# Patient Record
Sex: MALE | Race: WHITE | NOT HISPANIC OR LATINO | URBAN - METROPOLITAN AREA
[De-identification: names, ages, dates, MRNs, and addresses within clinical notes are randomized per-mention and may not be internally consistent; named-entity substitution may affect disease eponyms.]

---

## 2018-04-09 ENCOUNTER — NEW PATIENT (OUTPATIENT)
Dept: URBAN - METROPOLITAN AREA CLINIC 24 | Facility: CLINIC | Age: 73
End: 2018-04-09
Payer: COMMERCIAL

## 2018-04-09 DIAGNOSIS — H00.021 HORDEOLUM INTERNUM (MEIBOMIAN GLAND DYSFUNCTION), RIGHT UPPER LID: ICD-10-CM

## 2018-04-09 DIAGNOSIS — H52.4 PRESBYOPIA: ICD-10-CM

## 2018-04-09 DIAGNOSIS — H25.813 COMBINED FORMS OF AGE-RELATED CATARACT, BILATERAL: Primary | ICD-10-CM

## 2018-04-09 DIAGNOSIS — H00.12 CHALAZION RIGHT LOWER LID: ICD-10-CM

## 2018-04-09 DIAGNOSIS — H00.024 HORDEOLUM INTERNUM (MEIBOMIAN GLAND DYSFUNCTION), LEFT UPPER LID: ICD-10-CM

## 2018-04-09 DIAGNOSIS — H16.143 PUNCTATE KERATITIS, BILATERAL: ICD-10-CM

## 2018-04-09 PROCEDURE — 92015 DETERMINE REFRACTIVE STATE: CPT | Performed by: OPTOMETRIST

## 2018-04-09 PROCEDURE — 92004 COMPRE OPH EXAM NEW PT 1/>: CPT | Performed by: OPTOMETRIST

## 2018-04-09 RX ORDER — OMEGA-3 FATTY ACIDS 1000 MG
CAPSULE ORAL
Qty: 30 | Refills: 3 | Status: ACTIVE
Start: 2018-04-09

## 2018-04-09 ASSESSMENT — VISUAL ACUITY
OS: 20/25
OD: 20/25

## 2018-04-09 ASSESSMENT — KERATOMETRY
OS: 42.01
OD: 42.04

## 2018-04-09 ASSESSMENT — INTRAOCULAR PRESSURE
OS: 20
OD: 20

## 2019-11-25 ENCOUNTER — FOLLOW UP ESTABLISHED (OUTPATIENT)
Dept: URBAN - METROPOLITAN AREA CLINIC 24 | Facility: CLINIC | Age: 74
End: 2019-11-25
Payer: COMMERCIAL

## 2019-11-25 DIAGNOSIS — H43.811 VITREOUS DEGENERATION, RIGHT EYE: ICD-10-CM

## 2019-11-25 PROCEDURE — 92014 COMPRE OPH EXAM EST PT 1/>: CPT | Performed by: OPTOMETRIST

## 2019-11-25 PROCEDURE — 92134 CPTRZ OPH DX IMG PST SGM RTA: CPT | Performed by: OPTOMETRIST

## 2019-11-25 ASSESSMENT — KERATOMETRY
OS: 42.09
OD: 42.09

## 2019-11-25 ASSESSMENT — INTRAOCULAR PRESSURE
OD: 13
OS: 12

## 2019-11-25 ASSESSMENT — VISUAL ACUITY
OS: 20/25
OD: 20/20

## 2021-05-30 ENCOUNTER — RECORDS - HEALTHEAST (OUTPATIENT)
Dept: ADMINISTRATIVE | Facility: CLINIC | Age: 76
End: 2021-05-30

## 2021-06-05 ENCOUNTER — RECORDS - HEALTHEAST (OUTPATIENT)
Dept: SCHEDULING | Facility: CLINIC | Age: 76
End: 2021-06-05

## 2021-06-05 DIAGNOSIS — N04.9 NEPHROTIC SYNDROME WITH PATHOLOGICAL LESION IN KIDNEY: ICD-10-CM

## 2024-02-10 ENCOUNTER — APPOINTMENT (OUTPATIENT)
Dept: CT IMAGING | Facility: CLINIC | Age: 79
End: 2024-02-10
Attending: STUDENT IN AN ORGANIZED HEALTH CARE EDUCATION/TRAINING PROGRAM
Payer: MEDICARE

## 2024-02-10 ENCOUNTER — HOSPITAL ENCOUNTER (EMERGENCY)
Facility: CLINIC | Age: 79
Discharge: HOME | End: 2024-02-10
Attending: STUDENT IN AN ORGANIZED HEALTH CARE EDUCATION/TRAINING PROGRAM | Admitting: STUDENT IN AN ORGANIZED HEALTH CARE EDUCATION/TRAINING PROGRAM
Payer: MEDICARE

## 2024-02-10 VITALS
RESPIRATION RATE: 51 BRPM | OXYGEN SATURATION: 98 % | SYSTOLIC BLOOD PRESSURE: 133 MMHG | BODY MASS INDEX: 19.89 KG/M2 | HEART RATE: 75 BPM | DIASTOLIC BLOOD PRESSURE: 78 MMHG | HEIGHT: 74 IN | WEIGHT: 155 LBS

## 2024-02-10 DIAGNOSIS — J43.2 CENTRILOBULAR EMPHYSEMA (H): ICD-10-CM

## 2024-02-10 LAB
ANION GAP SERPL CALCULATED.3IONS-SCNC: 6 MMOL/L (ref 7–15)
BASOPHILS # BLD AUTO: 0 10E3/UL (ref 0–0.2)
BASOPHILS NFR BLD AUTO: 0 %
BUN SERPL-MCNC: 11.9 MG/DL (ref 8–23)
CALCIUM SERPL-MCNC: 9 MG/DL (ref 8.8–10.2)
CHLORIDE SERPL-SCNC: 103 MMOL/L (ref 98–107)
CREAT SERPL-MCNC: 0.97 MG/DL (ref 0.67–1.17)
DEPRECATED HCO3 PLAS-SCNC: 28 MMOL/L (ref 22–29)
EGFRCR SERPLBLD CKD-EPI 2021: 80 ML/MIN/1.73M2
EOSINOPHIL # BLD AUTO: 0.2 10E3/UL (ref 0–0.7)
EOSINOPHIL NFR BLD AUTO: 3 %
ERYTHROCYTE [DISTWIDTH] IN BLOOD BY AUTOMATED COUNT: 12.9 % (ref 10–15)
GLUCOSE SERPL-MCNC: 91 MG/DL (ref 70–99)
HCT VFR BLD AUTO: 39 % (ref 40–53)
HGB BLD-MCNC: 13.2 G/DL (ref 13.3–17.7)
IMM GRANULOCYTES # BLD: 0.1 10E3/UL
IMM GRANULOCYTES NFR BLD: 1 %
LYMPHOCYTES # BLD AUTO: 0.8 10E3/UL (ref 0.8–5.3)
LYMPHOCYTES NFR BLD AUTO: 11 %
MCH RBC QN AUTO: 35.1 PG (ref 26.5–33)
MCHC RBC AUTO-ENTMCNC: 33.8 G/DL (ref 31.5–36.5)
MCV RBC AUTO: 104 FL (ref 78–100)
MONOCYTES # BLD AUTO: 0.3 10E3/UL (ref 0–1.3)
MONOCYTES NFR BLD AUTO: 5 %
NEUTROPHILS # BLD AUTO: 6 10E3/UL (ref 1.6–8.3)
NEUTROPHILS NFR BLD AUTO: 80 %
NRBC # BLD AUTO: 0 10E3/UL
NRBC BLD AUTO-RTO: 0 /100
PLATELET # BLD AUTO: 273 10E3/UL (ref 150–450)
POTASSIUM SERPL-SCNC: 4.4 MMOL/L (ref 3.4–5.3)
RBC # BLD AUTO: 3.76 10E6/UL (ref 4.4–5.9)
SODIUM SERPL-SCNC: 137 MMOL/L (ref 135–145)
TROPONIN T SERPL HS-MCNC: 12 NG/L
TROPONIN T SERPL HS-MCNC: 14 NG/L
WBC # BLD AUTO: 7.4 10E3/UL (ref 4–11)

## 2024-02-10 PROCEDURE — 250N000009 HC RX 250: Performed by: STUDENT IN AN ORGANIZED HEALTH CARE EDUCATION/TRAINING PROGRAM

## 2024-02-10 PROCEDURE — 250N000012 HC RX MED GY IP 250 OP 636 PS 637: Performed by: STUDENT IN AN ORGANIZED HEALTH CARE EDUCATION/TRAINING PROGRAM

## 2024-02-10 PROCEDURE — 99285 EMERGENCY DEPT VISIT HI MDM: CPT | Mod: 25

## 2024-02-10 PROCEDURE — 94640 AIRWAY INHALATION TREATMENT: CPT

## 2024-02-10 PROCEDURE — 93005 ELECTROCARDIOGRAM TRACING: CPT | Performed by: STUDENT IN AN ORGANIZED HEALTH CARE EDUCATION/TRAINING PROGRAM

## 2024-02-10 PROCEDURE — 250N000011 HC RX IP 250 OP 636: Performed by: STUDENT IN AN ORGANIZED HEALTH CARE EDUCATION/TRAINING PROGRAM

## 2024-02-10 PROCEDURE — 36415 COLL VENOUS BLD VENIPUNCTURE: CPT | Performed by: STUDENT IN AN ORGANIZED HEALTH CARE EDUCATION/TRAINING PROGRAM

## 2024-02-10 PROCEDURE — 80048 BASIC METABOLIC PNL TOTAL CA: CPT | Performed by: STUDENT IN AN ORGANIZED HEALTH CARE EDUCATION/TRAINING PROGRAM

## 2024-02-10 PROCEDURE — 85004 AUTOMATED DIFF WBC COUNT: CPT | Performed by: STUDENT IN AN ORGANIZED HEALTH CARE EDUCATION/TRAINING PROGRAM

## 2024-02-10 PROCEDURE — 84484 ASSAY OF TROPONIN QUANT: CPT | Performed by: STUDENT IN AN ORGANIZED HEALTH CARE EDUCATION/TRAINING PROGRAM

## 2024-02-10 PROCEDURE — 999N000157 HC STATISTIC RCP TIME EA 10 MIN

## 2024-02-10 PROCEDURE — 71275 CT ANGIOGRAPHY CHEST: CPT | Mod: MF

## 2024-02-10 RX ORDER — ALBUTEROL SULFATE 90 UG/1
2 AEROSOL, METERED RESPIRATORY (INHALATION) EVERY 6 HOURS PRN
Qty: 18 G | Refills: 0 | Status: SHIPPED | OUTPATIENT
Start: 2024-02-10

## 2024-02-10 RX ORDER — IPRATROPIUM BROMIDE AND ALBUTEROL SULFATE 2.5; .5 MG/3ML; MG/3ML
3 SOLUTION RESPIRATORY (INHALATION) ONCE
Status: COMPLETED | OUTPATIENT
Start: 2024-02-10 | End: 2024-02-10

## 2024-02-10 RX ORDER — PREDNISONE 20 MG/1
60 TABLET ORAL ONCE
Status: COMPLETED | OUTPATIENT
Start: 2024-02-10 | End: 2024-02-10

## 2024-02-10 RX ORDER — IOPAMIDOL 755 MG/ML
100 INJECTION, SOLUTION INTRAVASCULAR ONCE
Status: COMPLETED | OUTPATIENT
Start: 2024-02-10 | End: 2024-02-10

## 2024-02-10 RX ORDER — PREDNISONE 20 MG/1
TABLET ORAL
Qty: 10 TABLET | Refills: 0 | Status: SHIPPED | OUTPATIENT
Start: 2024-02-10

## 2024-02-10 RX ADMIN — IOPAMIDOL 75 ML: 755 INJECTION, SOLUTION INTRAVENOUS at 13:22

## 2024-02-10 RX ADMIN — IPRATROPIUM BROMIDE AND ALBUTEROL SULFATE 3 ML: .5; 3 SOLUTION RESPIRATORY (INHALATION) at 12:03

## 2024-02-10 RX ADMIN — PREDNISONE 60 MG: 20 TABLET ORAL at 14:14

## 2024-02-10 ASSESSMENT — ACTIVITIES OF DAILY LIVING (ADL): ADLS_ACUITY_SCORE: 35

## 2024-02-10 NOTE — ED PROVIDER NOTES
EMERGENCY DEPARTMENT ENCOUNTER      NAME: Gallo Mccoy  AGE: 78 year old male  YOB: 1945  MRN: [id number]  EVALUATION DATE & TIME: No admission date for patient encounter.    PCP: No primary care provider on file.    ED PROVIDER: Sameer Kidd MD      Chief Complaint   Patient presents with    Shortness of Breath         FINAL IMPRESSION:  1. Centrilobular emphysema (H)          ED COURSE & MEDICAL DECISION MAKING:    Pertinent Labs & Imaging studies reviewed. (See chart for details)  78 year old male presents to the Emergency Department for evaluation of shortness of breath.    ED Course as of 02/10/24 1444   Sat Feb 10, 2024   1200 Patient is a 78-year-old male with history of renal cell carcinoma in remission, tobacco use and hypertension admitted and presents emergency department for evaluation shortness of breath.  Patient states that he is persistent dyspnea on exertion for the past several months to a year but it has been much worse over the past week since he took a recent trip from [place].  Notes that while he was in the airport on the way to [place] felt significantly short of breath even after only 10 or 15 steps.  Does not have any significant associated chest pain.  Denies any new lower extremity edema.  No recent fevers.  No new cough.  No abdominal pain.  He has had decreased appetite for the past several months.  No prior history of blood clots.  No significant cardiac history.  Has been cutting down smoking but for about 50 years with a half a pack to a pack a day smoker.  Never been formally diagnosed with COPD or asthma.    Exam here patient is in no acute distress.  Afebrile and slightly hypertensive at 145/84.  Borderline tachycardic with a regular rhythm.  Good peripheral perfusion.  No significant lower extremity edema.  Lung sounds are diminished diffusely with faint expiratory inspiratory wheezes bilaterally.  Abdomen soft nontender and nondistended.    Initial nocturnal's  problem includes not limited to COPD, pulmonary embolism, ACS, pneumonia or underlying lung malignancy.  Will obtain EKG blood work including a troponin as well as a CTA of the chest to evaluate for pulmonary embolism.  Will also give a DuoNeb as patient is wheezy here to see if this helps his symptoms.   1358 Labs reviewed interpreted myself.  BMP is reassuring.  CBC shows mild anemia at 13.2 but do not suspect this is significantly contributing to the patient's symptoms of shortness of breath.  No leukocytosis.  Initial troponin is reassuring at 14.  Will obtain a delta troponin to evaluate for any significant increase.   1359 CTA of the chest does not show any signs of pulmonary embolism or acute aortic pathology.  Incidentally noted to be a 6 mm pulmonary nodule in the left upper lobe.  He does smoke and therefore will require follow-up CT in about 6 to 12 months.  Additionally there noted to be some emphysematous changes in conjunction with his wheezing on exam suspect this is contributing to his shortness of breath for the past several months.   1412 On repeat evaluation patient does have improved aeration and improvement of wheezing.  Still has slight wheezing on expiration but inspiratory wheezes have resolved.  Discussed lab findings with the patient and as well as need for follow-up for the pulmonary nodule seen.  Plans to follow-up with the [facility] when he returns to [place] early next week.   1443 Repeat troponin downtrending and low sufficient for ACS at this point in time.  Further discussion with the patient and his wife seems his dyspnea on exertion has been ongoing for quite some time several months to a year.  Suspect progression of underlying obstructive lung disease as cause of his symptoms.  Will send him home with a short course of steroids and he already has albuterol at home.  Recommend follow-up with his primary care doctor in [place] as you may benefit from further pulmonary function  testing.  Otherwise patient is saturating well on room air here and is otherwise well-appearing and safe for discharge home at this point in time.     11:24 AM I met and evaluated the patient.     Medical Decision Making    History:  Supplemental history from: Documented in chart  External Record(s) reviewed: Documented in chart    Work Up:  Chart documentation includes differential considered and any EKGs or imaging independently interpreted by provider, where specified.  In additional to work up documented, I considered the following work up: Documented in chart, if applicable.    External consultation:  Discussion of management with another provider: Documented in chart, if applicable    Complicating factors:  Care impacted by chronic illness: Cancer/Chemotherapy  Care affected by social determinants of health: N/A    Disposition considerations: Discharge. I prescribed additional prescription strength medication(s) as charted. I considered admission, but discharged patient after significant clinical improvement.       At the conclusion of the encounter I discussed the results of all of the tests and the disposition. The questions were answered. The patient or family acknowledged understanding and was agreeable with the care plan.     0 minutes of critical care time     MEDICATIONS GIVEN IN THE EMERGENCY:  Medications   ipratropium - albuterol 0.5 mg/2.5 mg/3 mL (DUONEB) neb solution 3 mL (3 mLs Nebulization $Given 2/10/24 1203)   iopamidol (ISOVUE-370) solution 100 mL (75 mLs Intravenous $Given 2/10/24 1322)   predniSONE (DELTASONE) tablet 60 mg (60 mg Oral $Given 2/10/24 1414)       NEW PRESCRIPTIONS STARTED AT TODAY'S ER VISIT  New Prescriptions    ALBUTEROL (PROAIR HFA/PROVENTIL HFA/VENTOLIN HFA) 108 (90 BASE) MCG/ACT INHALER    Inhale 2 puffs into the lungs every 6 hours as needed for shortness of breath, wheezing or cough    PREDNISONE (DELTASONE) 20 MG TABLET    Take two tablets (= 40mg) each day for 5  (five) days          =================================================================    [facility]    Patient information was obtained from: patient     Use of : N/A         Gallo Mccoy is a 78 year old male with a pertinent history of renal cancer and sepsis who presents to this ED by private car for evaluation of shortness of breath.    Patient reports ongoing shortness of breath that has progressively worsened since  2/7, upon getting off a flight back from [place]. His symptoms are made worse with exertion. Patient endorses a past history of smoking and states he presently smokes, but does not smoke as often as he used to. Patient states he last had half a cigarette on 2/7. He also endorses ongoing loss in appetite. He denies a past medical history of asthma, COPD, blood clot, or heart problems. Patient lives in [place] and is visiting family with his wife.    Patient denies leg swelling, chest pain, or fever. No other medical concerns are expressed at this time. .      REVIEW OF SYSTEMS   Refer to the [facility]    PAST MEDICAL HISTORY:  History reviewed. No pertinent past medical history.    PAST SURGICAL HISTORY:  Past Surgical History:   Procedure Laterality Date    HERNIA REPAIR  2015    TONSILLECTOMY             CURRENT MEDICATIONS:    albuterol (PROAIR HFA/PROVENTIL HFA/VENTOLIN HFA) 108 (90 Base) MCG/ACT inhaler  predniSONE (DELTASONE) 20 MG tablet  acyclovir (ZOVIRAX) 400 MG tablet  ibuprofen (ADVIL,MOTRIN) 200 MG tablet  levothyroxine (SYNTHROID, LEVOTHROID) 75 MCG tablet  midodrine (PROAMATINE) 2.5 MG tablet  naproxen sodium (ALEVE) 220 MG tablet  UNABLE TO FIND        ALLERGIES:  Allergies   Allergen Reactions    Dust Mites Unknown    Grass Unknown    Lenalidomide Rash       FAMILY HISTORY:  History reviewed. No pertinent family history.    SOCIAL HISTORY:   Social History     Socioeconomic History    Marital status:    Tobacco Use    Smoking status: Every Day     Packs/day: 0.75     Years:  "40.00     Additional pack years: 0.00     Total pack years: 30.00     Types: Cigarettes    Smokeless tobacco: Never   Substance and Sexual Activity    Alcohol use: Yes     Alcohol/week: 6.0 standard drinks of alcohol    Drug use: No       VITALS:  BP (!) 142/79   Pulse 76   Resp (!) 32   Ht 1.88 m (6' 2\")   Wt 70.3 kg (155 lb)   SpO2 96%   BMI 19.90 kg/m      PHYSICAL EXAM    Constitutional: Well developed, Well nourished, NAD,  HENT: Normocephalic, Atraumatic,  mucous membranes moist,   Neck- trachea midline, No stridor.    Eyes:EOMI, Conjunctiva normal, No discharge.   Respiratory: Tachypneic, diminished breath sounds bilaterally with faint inspiratory and expiratory wheezing.    Cardiovascular: Normal heart rate, Regular rhythm,  No murmurs, no lower extremity edema.  Abdominal: Soft, No tenderness, No rebound or guarding.     Musculoskeletal: no deformity or malalignment   Integument: Warm, Dry, No erythema, No rash.   Neurologic: Alert & oriented x 3   Psychiatric: Affect normal, Cooperative.      LAB:  All pertinent labs reviewed and interpreted.  Results for orders placed or performed during the hospital encounter of 02/10/24   CT Chest Pulmonary Embolism w Contrast    Impression    IMPRESSION:  1.  No pulmonary emboli. No acute aortic syndrome.  2.  A 6 mm left upper lobe nodule. Please refer to management guidelines below.  3.  Mild hyperinflation of the lungs, mild to moderate mid and upper lung centrilobular emphysema, chronic bronchial wall thickening and segments of endobronchial mucosal thickening and secretions.  4.  Moderate to severe three-vessel coronary artery calcification.    REFERENCE:  Guidelines for Management of Incidental Pulmonary Nodules Detected on CT Images: From the Fleischner Society 2017.   Guidelines apply to incidental nodules in patients who are 35 years or older.  Guidelines do not apply to lung cancer screening, patients with immunosuppression, or patients with known " primary cancer.    SINGLE NODULE  Nodule size <6 mm  Low-risk patients: No follow-up needed.  High-risk patients: Optional follow-up at 12 months.    Nodule size 6-8 mm  Low-risk patients: Follow-up CT at 6-12 months, then consider CT at 18-24 months.  High-risk patients: Follow-up CT at 6-12 months, then at 18-24 months if no change.    Nodule size >8 mm  Either low or high-risk patients:  Consider CT, PET/CT, or tissue sampling at 3 months.     Basic metabolic panel   Result Value Ref Range    Sodium 137 135 - 145 mmol/L    Potassium 4.4 3.4 - 5.3 mmol/L    Chloride 103 98 - 107 mmol/L    Carbon Dioxide (CO2) 28 22 - 29 mmol/L    Anion Gap 6 (L) 7 - 15 mmol/L    Urea Nitrogen 11.9 8.0 - 23.0 mg/dL    Creatinine 0.97 0.67 - 1.17 mg/dL    GFR Estimate 80 >60 mL/min/1.73m2    Calcium 9.0 8.8 - 10.2 mg/dL    Glucose 91 70 - 99 mg/dL   Result Value Ref Range    Troponin T, High Sensitivity 14 <=22 ng/L   CBC with platelets and differential   Result Value Ref Range    WBC Count 7.4 4.0 - 11.0 10e3/uL    RBC Count 3.76 (L) 4.40 - 5.90 10e6/uL    Hemoglobin 13.2 (L) 13.3 - 17.7 g/dL    Hematocrit 39.0 (L) 40.0 - 53.0 %     (H) 78 - 100 fL    MCH 35.1 (H) 26.5 - 33.0 pg    MCHC 33.8 31.5 - 36.5 g/dL    RDW 12.9 10.0 - 15.0 %    Platelet Count 273 150 - 450 10e3/uL    % Neutrophils 80 %    % Lymphocytes 11 %    % Monocytes 5 %    % Eosinophils 3 %    % Basophils 0 %    % Immature Granulocytes 1 %    NRBCs per 100 WBC 0 <1 /100    Absolute Neutrophils 6.0 1.6 - 8.3 10e3/uL    Absolute Lymphocytes 0.8 0.8 - 5.3 10e3/uL    Absolute Monocytes 0.3 0.0 - 1.3 10e3/uL    Absolute Eosinophils 0.2 0.0 - 0.7 10e3/uL    Absolute Basophils 0.0 0.0 - 0.2 10e3/uL    Absolute Immature Granulocytes 0.1 <=0.4 10e3/uL    Absolute NRBCs 0.0 10e3/uL   Result Value Ref Range    Troponin T, High Sensitivity 12 <=22 ng/L       RADIOLOGY:  Reviewed all pertinent imaging. Please see official radiology report.  CT Chest Pulmonary Embolism w  Contrast   Final Result   IMPRESSION:   1.  No pulmonary emboli. No acute aortic syndrome.   2.  A 6 mm left upper lobe nodule. Please refer to management guidelines below.   3.  Mild hyperinflation of the lungs, mild to moderate mid and upper lung centrilobular emphysema, chronic bronchial wall thickening and segments of endobronchial mucosal thickening and secretions.   4.  Moderate to severe three-vessel coronary artery calcification.      REFERENCE:   Guidelines for Management of Incidental Pulmonary Nodules Detected on CT Images: From the Fleischner Society 2017.    Guidelines apply to incidental nodules in patients who are 35 years or older.   Guidelines do not apply to lung cancer screening, patients with immunosuppression, or patients with known primary cancer.      SINGLE NODULE   Nodule size <6 mm   Low-risk patients: No follow-up needed.   High-risk patients: Optional follow-up at 12 months.      Nodule size 6-8 mm   Low-risk patients: Follow-up CT at 6-12 months, then consider CT at 18-24 months.   High-risk patients: Follow-up CT at 6-12 months, then at 18-24 months if no change.      Nodule size >8 mm   Either low or high-risk patients:   Consider CT, PET/CT, or tissue sampling at 3 months.             EKG:    Performed at: 11:25 AM    Impression: EKG shows a sinus rhythm at 99 beats a minute, normal axis, normal LA, QRS and QTc durations, no ST elevation, no significant ST depressions or acute ischemic changes.  No prior EKGs available for comparison.      I have independently reviewed and interpreted the EKG(s) documented above.    PROCEDURES:         [employer] Documentation:   CMS Diagnoses:              I, Karina Chaney, am serving as a scribe to document services personally performed by Sameer Kidd MD based on my observation and the provider's statements to me. I, Sameer Kidd MD, attest that Karina Chaney is acting in a scribe capacity, has observed my performance of the  services and has documented them in accordance with my direction.    Sameer Kidd MD  [facility] EMERGENCY ROOM  [address] [address] [phone]       Sameer Kidd MD  02/10/24 3762

## 2024-02-10 NOTE — PROGRESS NOTES
Patient seen on room air, breath sounds diminished pre and post neb. No sob on exam.   One Duoneb given per MD order. Tolerated well.     Zoe Yeboah, RT

## 2024-02-10 NOTE — ED NOTES
Discharge paperwork discussed with pt. Questions were answered to patient satisfaction. Mora Hudson RN 2/10/2024 3:02 PM

## 2024-02-10 NOTE — ED TRIAGE NOTES
Pt presents to the ED with c/o worsening SOB since Wednesday 2/7/24. Pt was on a flight back from [place] when he got off the plane he felt SOB. Denies fevers, cough, or CP. Pt hx of smoking.

## 2024-02-11 LAB
ATRIAL RATE - MUSE: 99 BPM
DIASTOLIC BLOOD PRESSURE - MUSE: NORMAL MMHG
INTERPRETATION ECG - MUSE: NORMAL
P AXIS - MUSE: 14 DEGREES
PR INTERVAL - MUSE: 150 MS
QRS DURATION - MUSE: 76 MS
QT - MUSE: 354 MS
QTC - MUSE: 454 MS
R AXIS - MUSE: -1 DEGREES
SYSTOLIC BLOOD PRESSURE - MUSE: NORMAL MMHG
T AXIS - MUSE: 36 DEGREES
VENTRICULAR RATE- MUSE: 99 BPM

## 2024-02-11 NOTE — ED NOTES
Placed orders for EKG.  Date of exam was on 02/10/2024 at 11:25  Devorah Escobedo RN 2/11/2024 5:00 AM

## 2024-07-15 ENCOUNTER — OFFICE VISIT (OUTPATIENT)
Dept: URBAN - METROPOLITAN AREA CLINIC 24 | Facility: CLINIC | Age: 79
End: 2024-07-15
Payer: COMMERCIAL

## 2024-07-15 DIAGNOSIS — H43.811 VITREOUS DEGENERATION, RIGHT EYE: ICD-10-CM

## 2024-07-15 DIAGNOSIS — H25.811 COMBINED FORMS OF AGE-RELATED CATARACT, RIGHT EYE: Primary | ICD-10-CM

## 2024-07-15 DIAGNOSIS — H26.492 OTHER SECONDARY CATARACT, LEFT EYE: ICD-10-CM

## 2024-07-15 PROCEDURE — 99204 OFFICE O/P NEW MOD 45 MIN: CPT | Performed by: OPTOMETRIST

## 2024-07-15 PROCEDURE — 92134 CPTRZ OPH DX IMG PST SGM RTA: CPT | Performed by: OPTOMETRIST

## 2024-07-15 ASSESSMENT — INTRAOCULAR PRESSURE
OD: 10
OS: 11

## 2024-07-15 ASSESSMENT — VISUAL ACUITY
OD: 20/40
OS: 20/20

## 2024-07-29 ENCOUNTER — ADULT PHYSICAL (OUTPATIENT)
Dept: URBAN - METROPOLITAN AREA CLINIC 24 | Facility: CLINIC | Age: 79
End: 2024-07-29
Payer: COMMERCIAL

## 2024-07-29 DIAGNOSIS — H25.811 COMBINED FORMS OF AGE-RELATED CATARACT, RIGHT EYE: Primary | ICD-10-CM

## 2024-07-29 DIAGNOSIS — Z01.818 ENCOUNTER FOR OTHER PREPROCEDURAL EXAMINATION: Primary | ICD-10-CM

## 2024-07-29 PROCEDURE — 99203 OFFICE O/P NEW LOW 30 MIN: CPT

## 2024-07-29 RX ORDER — PSEUDOEPHEDRINE HCL 30 MG
30 MG TABLET ORAL
Qty: 0 | Refills: 0 | Status: ACTIVE
Start: 2024-07-29

## 2024-07-29 RX ORDER — ACYCLOVIR 200 MG/1
200 MG CAPSULE ORAL
Qty: 0 | Refills: 0 | Status: ACTIVE
Start: 2024-07-29

## 2024-07-29 RX ORDER — MIDODRINE HYDROCHLORIDE 5 MG/1
5 MG TABLET ORAL
Qty: 0 | Refills: 0 | Status: ACTIVE
Start: 2024-07-29

## 2024-07-29 RX ORDER — ALBUTEROL SULFATE 90 UG/1
AEROSOL, METERED RESPIRATORY (INHALATION)
Qty: 0 | Refills: 0 | Status: ACTIVE
Start: 2024-07-29

## 2024-07-29 RX ORDER — LEVOTHYROXINE SODIUM 75 UG/1
TABLET ORAL
Qty: 0 | Refills: 0 | Status: ACTIVE
Start: 2024-07-29

## 2024-07-29 RX ORDER — TIOTROPIUM BROMIDE INHALATION SPRAY 3.12 UG/1
SPRAY, METERED RESPIRATORY (INHALATION)
Qty: 0 | Refills: 0 | Status: ACTIVE
Start: 2024-07-29

## 2025-08-25 ENCOUNTER — OFFICE VISIT (OUTPATIENT)
Dept: URBAN - METROPOLITAN AREA CLINIC 24 | Facility: CLINIC | Age: 80
End: 2025-08-25
Payer: COMMERCIAL

## 2025-08-25 DIAGNOSIS — H35.363 DRUSEN (DEGENERATIVE) OF MACULA, BILATERAL: ICD-10-CM

## 2025-08-25 DIAGNOSIS — H43.811 VITREOUS DEGENERATION, RIGHT EYE: ICD-10-CM

## 2025-08-25 DIAGNOSIS — H25.811 COMBINED FORMS OF AGE-RELATED CATARACT, RIGHT EYE: Primary | ICD-10-CM

## 2025-08-25 DIAGNOSIS — H26.492 OTHER SECONDARY CATARACT, LEFT EYE: ICD-10-CM

## 2025-08-25 PROCEDURE — 99214 OFFICE O/P EST MOD 30 MIN: CPT | Performed by: OPTOMETRIST

## 2025-08-25 PROCEDURE — 92134 CPTRZ OPH DX IMG PST SGM RTA: CPT | Performed by: OPTOMETRIST

## 2025-08-25 ASSESSMENT — INTRAOCULAR PRESSURE
OD: 12
OS: 12

## 2025-08-25 ASSESSMENT — VISUAL ACUITY
OD: 20/50
OS: 20/20